# Patient Record
Sex: MALE | Race: ASIAN | NOT HISPANIC OR LATINO | URBAN - METROPOLITAN AREA
[De-identification: names, ages, dates, MRNs, and addresses within clinical notes are randomized per-mention and may not be internally consistent; named-entity substitution may affect disease eponyms.]

---

## 2023-09-20 ENCOUNTER — EMERGENCY (EMERGENCY)
Facility: HOSPITAL | Age: 28
LOS: 1 days | Discharge: HOME | End: 2023-09-20
Admitting: EMERGENCY MEDICINE
Payer: MEDICAID

## 2023-09-20 VITALS
RESPIRATION RATE: 16 BRPM | SYSTOLIC BLOOD PRESSURE: 136 MMHG | OXYGEN SATURATION: 100 % | DIASTOLIC BLOOD PRESSURE: 78 MMHG | TEMPERATURE: 98 F | HEART RATE: 79 BPM

## 2023-09-20 PROCEDURE — 99284 EMERGENCY DEPT VISIT MOD MDM: CPT

## 2023-09-20 RX ORDER — IBUPROFEN 200 MG
1 TABLET ORAL
Qty: 20 | Refills: 0
Start: 2023-09-20

## 2023-09-20 RX ORDER — IBUPROFEN 200 MG
600 TABLET ORAL ONCE
Refills: 0 | Status: COMPLETED | OUTPATIENT
Start: 2023-09-20 | End: 2023-09-20

## 2023-09-20 RX ORDER — CHLORHEXIDINE GLUCONATE 213 G/1000ML
15 SOLUTION TOPICAL
Qty: 1 | Refills: 0
Start: 2023-09-20

## 2023-09-20 RX ADMIN — Medication 600 MILLIGRAM(S): at 11:02

## 2023-09-20 NOTE — ED PROVIDER NOTE - OBJECTIVE STATEMENT
A 27-year-old male with no known past medical history, presents with dental pain since Sunday. Pt reports pain is possibly caused by a wisdom tooth in the left lower back, described as constant, sharp, 8/10, with no radiation pain. Pt reports subjective fever and chill. Denies recent travel, sick contact, recent sick/trauma, chest pain, shortness of breath, difficulty swallowing, sinus pain, sore throat, nausea, vomiting, diarrhea, constipation, blood in stool/urine.

## 2023-09-20 NOTE — ED PROVIDER NOTE - PATIENT PORTAL LINK FT
You can access the FollowMyHealth Patient Portal offered by [facility] by registering at the following website: [email or web address]. By joining [facility]s FollowMyHealth portal, you will also be able to view your health information using other applications (apps) compatible with our system.

## 2023-09-20 NOTE — ED PROVIDER NOTE - DISCHARGE REVIEW MATERIAL PRESENTED
February 1, 2018      Tal Mcclure MD  [address] [place] [address]           [facility]  [address] [place] [address]  Phone: [phone]  Fax: [phone]          Patient: Juan Pablo Saeed   MR Number: [id number]   YOB: 1979   Date of Visit: 2/1/2018       Dear Dr. Tal Mcclure:    Thank you for referring Juan Pablo Saeed to me for evaluation. Attached you will find relevant portions of my assessment and plan of care.    If you have questions, please do not hesitate to call me. I look forward to following Juan Pablo Saeed along with you.    Sincerely,    Lucy Juarez PA-C    Enclosure  CC:  No Recipients    If you would like to receive this communication electronically, please contact [email or web address] or [phone] to request more information on [facility] access.    For providers and/or their staff who would like to refer a patient to [facility], please contact us through our one-stop-shop provider referral line, [facility], at [phone].    If you feel you have received this communication in error or would no longer like to receive these types of communications, please e-mail [email or web address]          You have dryness of your eyes. Please use over-the-counter artificial tears or lubricants (such as Systane, Refresh, Blink, Genteal), 1 drop in each eye 3-4 times per day. Please avoid drops that advertise redness-relief as these can be unhealthy for your eyes and can cause permanent redness if used long-term.       .

## 2023-09-20 NOTE — ED PROVIDER NOTE - NSFOLLOWUPINSTRUCTIONS_ED_ALL_ED_FT
Take motrin 600mg every 8 hours with food.  You may take it together with tylenol 100mg every 8 hours.  Peridex oral rinse twice daily.  Augmentin 875mg twice a day for 7 days.  Soft food diet.  Avoid hot or cold foods.  Follow up with dental clinic- our discharge lounge will help facilitate an appointment- [phone].  Return to ED for any worsening pain, swelling, fever.

## 2023-09-20 NOTE — ED PROVIDER NOTE - CLINICAL SUMMARY MEDICAL DECISION MAKING FREE TEXT BOX
A 27-year-old male with no known past medical history, presents with dental pain since Sunday. Pt reports pain is possibly caused by a wisdom tooth in the left lower back, described as constant, sharp, 8/10, with no radiation pain. Pt reports subjective fever and chill. Denies recent travel, sick contact, recent sick/trauma, chest pain, shortness of breath, difficulty swallowing, sinus pain, sore throat, nausea, vomiting, diarrhea, constipation, blood in stool/urine.     On physical exam, A&O*4, lungs equal and clear bilaterally. Pain on palpation in left lower jaw.  Eruption of third molar noted in left lower jaw with erythema. No signs of infection. Plan for further evaluation with dental followup. Pain control. A 27-year-old male with no known past medical history, presents with dental pain since Sunday. Pt reports pain is possibly caused by a wisdom tooth in the left lower back, described as constant, sharp, 8/10, with no radiation pain. Pt reports subjective fever and chill. Denies recent travel, sick contact, recent sick/trauma, chest pain, shortness of breath, difficulty swallowing, sinus pain, sore throat, nausea, vomiting, diarrhea, constipation, blood in stool/urine.     On physical exam, A&O*4, lungs equal and clear bilaterally. Pain on palpation in left lower jaw, no swelling.  Eruption of third molar noted in left lower jaw with erythema. No signs of infection. Plan for further evaluation with dental followup. Pain control.

## 2023-09-21 ENCOUNTER — APPOINTMENT (OUTPATIENT)
Age: 28
End: 2023-09-21
Payer: COMMERCIAL

## 2023-09-21 PROCEDURE — D0330 PANORAMIC RADIOGRAPHIC IMAGE: CPT

## 2023-09-21 PROCEDURE — D0140: CPT

## 2023-10-23 ENCOUNTER — APPOINTMENT (OUTPATIENT)
Age: 28
End: 2023-10-23
Payer: COMMERCIAL

## 2023-10-23 PROCEDURE — D9310: CPT

## 2024-01-05 ENCOUNTER — APPOINTMENT (OUTPATIENT)
Age: 29
End: 2024-01-05
Payer: COMMERCIAL

## 2024-01-05 PROCEDURE — D7240: CPT

## 2024-01-05 PROCEDURE — D9222: CPT

## 2024-01-05 PROCEDURE — D9223: CPT

## 2024-01-05 PROCEDURE — D7230: CPT

## 2024-01-12 ENCOUNTER — APPOINTMENT (OUTPATIENT)
Age: 29
End: 2024-01-12
Payer: COMMERCIAL

## 2024-01-12 PROCEDURE — 99024 POSTOP FOLLOW-UP VISIT: CPT

## 2024-01-16 ENCOUNTER — APPOINTMENT (OUTPATIENT)
Age: 29
End: 2024-01-16
Payer: COMMERCIAL

## 2024-01-16 PROCEDURE — 99024 POSTOP FOLLOW-UP VISIT: CPT
